# Patient Record
(demographics unavailable — no encounter records)

---

## 2024-12-09 NOTE — HISTORY OF PRESENT ILLNESS
[Y] : Patient uses contraception [Intravaginal Ring] : uses the intravaginal ring [N] : Patient denies prior pregnancies [Menarche Age: ____] : age at menarche was [unfilled] [Never active] : never active [LMPDate] : 11/30/2024 [PGHxTotal] : 0 [FreeTextEntry1] : 11/20/2024

## 2024-12-09 NOTE — PLAN
[FreeTextEntry1] : -we discussed the pathophysiology of PCOS and the difficulties making this diagnosis at early ages.  -she has an element of insulin resistance and she will see how the Metformin performs. We discussed the use of an aromatase inhibitor to regulate menses and ovulation at a later date.  -caloric restriction, better choices and increased activities are all encouraged.  -she will RTO in 1 year -we will see if she can get a Clare 3 plus continuous glucose monitor approved to monitor her glycemic responses to certain foods. Instructions for the application and use were given.  During this visit 30 minutes were spent face-to-face with greater than 50% of this time dedicated to counseling.